# Patient Record
Sex: MALE | Race: WHITE | NOT HISPANIC OR LATINO | Employment: OTHER | URBAN - METROPOLITAN AREA
[De-identification: names, ages, dates, MRNs, and addresses within clinical notes are randomized per-mention and may not be internally consistent; named-entity substitution may affect disease eponyms.]

---

## 2017-11-02 ENCOUNTER — OFFICE VISIT (OUTPATIENT)
Dept: SURGERY | Facility: CLINIC | Age: 52
End: 2017-11-02

## 2017-11-02 VITALS — WEIGHT: 171 LBS | HEIGHT: 63 IN | OXYGEN SATURATION: 98 % | HEART RATE: 78 BPM | BODY MASS INDEX: 30.3 KG/M2

## 2017-11-02 DIAGNOSIS — K64.5 THROMBOSED EXTERNAL HEMORRHOID: Primary | ICD-10-CM

## 2017-11-02 PROCEDURE — 99213 OFFICE O/P EST LOW 20 MIN: CPT | Performed by: SURGERY

## 2017-11-02 RX ORDER — LISINOPRIL 10 MG/1
TABLET ORAL
Refills: 1 | COMMUNITY
Start: 2017-09-19 | End: 2018-12-13

## 2017-11-02 RX ORDER — ATORVASTATIN CALCIUM 40 MG/1
TABLET, FILM COATED ORAL
Refills: 3 | COMMUNITY
Start: 2017-09-19 | End: 2022-08-15 | Stop reason: ALTCHOICE

## 2017-11-03 NOTE — PROGRESS NOTES
SUMMARY (A/P):    52-year-old gentleman presents with a one-month history of moderately severe perianal pain that seems to be worse with sitting.  On examination he seems to have a small but resolving thrombosed right sided external hemorrhoid.  I've recommended sitz baths on a regular basis.  I've asked him to return in 3-4 weeks if his symptoms have not resolved.      CC:  Anal pain    HPI:  52-year-old gentleman presents with 1 month history of moderately severe perianal pain that is worse with sitting.  No rectal bleeding.  Hemoglobin on 10/26/2017 was 15.3.    PHYSICAL EXAM:   Constitutional: Well-developed well-nourished, no acute distress   Heart rate 78   Weight (pounds) 171   BMI 30.3   Height (inches) 63  Eyes: Conjunctiva normal, sclera nonicteric  ENMT: Hearing grossly normal, oral mucosa moist  Gastrointestinal: Soft, nontender, small seemingly resolving right-sided thrombosed external hemorrhoid, no significant tenderness on palpation other than over the hemorrhoid itself  Musculoskeletal: Symmetric strength, normal gait  Psychiatric: Alert and oriented ×3, normal affect     ALLERGIES: reviewed, in Epic    MEDICATIONS: reviewed, in Epic    PMH:    Hypertension  Hyperlipidemia    PSH:    -EGD and colonoscopy 11/16/2015 (both normal) Dr. Powell  -Laparoscopic left inguinal hernia repair 1/28/2013 Dr. Powell    FAMILY HISTORY:    Negative for colorectal cancer    SOCIAL HISTORY:   Denies tobacco use  Denies alcohol use    ROS:  No chest pain or shortness of air.  Negative for unexpected weight loss  All other systems reviewed and negative other than presenting complaints.    DANIELLE POWELL M.D.

## 2018-03-15 ENCOUNTER — TELEPHONE (OUTPATIENT)
Dept: CARDIOLOGY | Facility: CLINIC | Age: 53
End: 2018-03-15

## 2018-03-15 NOTE — TELEPHONE ENCOUNTER
03/15/18  Edmin Ruslan  1965    Mr. Mercer walked into office on Tuesday, 3/13 to schedule new patient appt with Dr. Lynch. He states he was referred by Dr. Patel. I called Dr. Patel's office that day to see what patient needed to be seen for; they stated records were being faxed. I nor Alessandra have received these records.    I called Dr. Patel's office today for records. I was not able to leave a message on machine x 2. I faxed a request for records for this patient.    Parul ZIEGLER RN    03/15/18  1:16 PM  Office note received - tmm.

## 2018-05-09 ENCOUNTER — OFFICE VISIT (OUTPATIENT)
Dept: CARDIOLOGY | Facility: CLINIC | Age: 53
End: 2018-05-09

## 2018-05-09 VITALS
BODY MASS INDEX: 28.66 KG/M2 | DIASTOLIC BLOOD PRESSURE: 80 MMHG | WEIGHT: 172 LBS | SYSTOLIC BLOOD PRESSURE: 132 MMHG | HEART RATE: 59 BPM | HEIGHT: 65 IN

## 2018-05-09 DIAGNOSIS — E78.5 HYPERLIPIDEMIA, UNSPECIFIED HYPERLIPIDEMIA TYPE: ICD-10-CM

## 2018-05-09 DIAGNOSIS — I10 ESSENTIAL HYPERTENSION: Primary | ICD-10-CM

## 2018-05-09 PROCEDURE — 93000 ELECTROCARDIOGRAM COMPLETE: CPT | Performed by: INTERNAL MEDICINE

## 2018-05-09 PROCEDURE — 99203 OFFICE O/P NEW LOW 30 MIN: CPT | Performed by: INTERNAL MEDICINE

## 2018-05-09 NOTE — PROGRESS NOTES
Subjective:     Encounter Date:05/09/2018      Patient ID: Kayla Mercer is a 53 y.o. male.    Chief Complaint: HTN, hyperlipidemia    History of Present Illness    Dear Dr. Patel:    I had the pleasure of seeing the patient in cardiac followup today. As you well know, he is a duyen 53-year-old man with history of hypertension and hyperlipidemia. I saw him over 5 years ago, at which point he had some atypical chest pain.    He is a former smoker. He quit 15 years ago. 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 It is a very physical job and he gets a lot of exercise with it.    He says that he has a lot of anxiety. He is under stress both at work and at home. When he gets too stressed out, he does notice a pain in his upper left chest that sometimes radiates to his arm. He says that this is not a constant discomfort. It is highly variable but does not seem to occur at all during exercise.    He takes medication for hypertension and hyperlipidemia, though he says he is not very consistent about it.        Review of Systems   All other systems reviewed and are negative.    Family History   Problem Relation Age of Onset   • Heart disease Father      Social History   Substance Use Topics   • Smoking status: Former Smoker     Types: Cigarettes     Quit date: 5/9/2003   • Smokeless tobacco: Never Used   • Alcohol use No         ECG 12 Lead  Date/Time: 5/9/2018 9:31 AM  Performed by: AVIVA STALEY  Authorized by: AVIVA STALEY   Previous ECG: no previous ECG available  Rhythm: sinus rhythm  BPM: 59  Clinical impression: normal ECG               Objective:     Physical Exam   Constitutional: He is oriented to person, place, and time. He appears well-developed and well-nourished.   HENT:   Head: Normocephalic and atraumatic.   Neck: Normal range of motion. Neck supple.   Cardiovascular: Normal rate, regular rhythm and normal heart sounds.    Pulmonary/Chest: Effort normal and breath sounds normal.   Abdominal: Soft. Bowel  sounds are normal.   Musculoskeletal: Normal range of motion.   Neurological: He is alert and oriented to person, place, and time.   Skin: Skin is warm and dry.   Psychiatric: He has a normal mood and affect. His behavior is normal. Thought content normal.   Vitals reviewed.      Lab Review:       Assessment:          Diagnosis Plan   1. Essential hypertension     2. Hyperlipidemia, unspecified hyperlipidemia type            Plan:       It was a pleasure to see your patient in cardiac followup today. He is a duyen 53-year-old man with history of coronary artery disease in his father. He has treated hypertension and hyperlipidemia. He is a former smoker.    He is in very good physical shape. He exercises regularly as part of his job and he has no symptoms. I think he has some atypical stress-induced pain. I have given him reassurance about this. I do not think he needs any further evaluation at this time. I have encouraged him to try to take his medicines on a more regular basis.    He will contact me if he develops any change in his symptoms. Otherwise, I will see him again in a few years.

## 2018-12-11 PROBLEM — E78.5 HYPERLIPIDEMIA: Status: ACTIVE | Noted: 2018-12-11

## 2018-12-11 PROBLEM — I10 ESSENTIAL HYPERTENSION: Status: ACTIVE | Noted: 2018-12-11

## 2018-12-11 NOTE — PROGRESS NOTES
Date of Office Visit: 2018  Encounter Provider: BARBARA Valdes  Place of Service: [facility] CARDIOLOGY  Patient Name: Kayla Mercer  :1965      Subjective:     Chief Complaint:  Hypertension, hyperlipidemia    History of Present Illness:  Kayla Mercer is a pleasant 53 y.o. male who is new to me.  Outside records have been obtained and reviewed by me.  He has past medical history is outlined below.    The patient was last seen in office on 18 by Dr. Lynch.  He has a history of hypertension and hyperlipidemia.  Dr. Lynch last saw him over 5 years ago at which point he had some atypical chest pain.  He is a former smoker and quit about 15 years ago.  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  The patient does report he has a lot of anxiety.  He is under a lot of stress both at home and at work.  It was reported to Dr. Lynch that when he gets too stressed out he does notice a pain in his upper left chest that sometimes radiates to his arm.  He says that it's not a constant discomfort in it is highly variable but does not seem to occur at all during exercise.  He does have medication for his hypertension and hyperlipidemia, although he is not very consistent about taking it.  He also has a reported history of coronary artery disease in his father.  Dr. Lynch felt that he was in very good physical shape and that he exercises regularly as part of his job and his symptoms were not exacerbated by that.  Dr. Lynch felt that he has some atypical stressed induced pain.  Dr. Lynch reassured him about that.  He did not think he needed any further evaluation at that time.  He wanted him to take his medications on a more regular basis.  Dr. Lynch stated he could contact him if he developed any change in his symptoms otherwise he would see him again in a few years.    The patient is presenting back for follow-up for reported pain in his left side  and back.  He reports the pain starts in his left trapezius muscle and travels down his back into his left breast and chest area.  This occurs when he is more stressed out.  It does occasionally radiate into his left forearm.  It is described as a pressure.  It was worse last week and is now better this week.  He reports sometimes stretching and rest helps this.  It is not particularly worse with certain activities.  He reports that when he is more stressed he also gets a headache.  He takes aspirin 81 mg daily but sometimes will take 2 or 3 more aspirin a day to see if it helps with the pain and he is unsure if it helps or not.  He has a wrist blood pressure cuff at home and his blood pressure the lowest it's ever run was 138/91.  He reports Dr. Patel has always told him his blood pressure was okay.  He reports he just had blood work done with Dr. Patel and was told his cholesterol levels were high.  He is not currently taking his atorvastatin.  On his labs with Dr. Patel his kidney function was normal, liver function was normal, lipid panel was abnormal. With his back pain he does not have any associated shortness of breath, palpitations he does get a little bit sweatier however.  He denies any nausea, vomiting or diarrhea with it.  He denies any swelling of the legs, PND or orthopnea.  He denies any syncope, near-syncope and reports rare lightheadedness.  He denies any significant fatigue.      Requesting old records it appears that the patient had a cardiac catheterization on 8/13/2002 for unstable angina with Dr. Hilliard.  It revealed a left main with luminal irregularities.  The LAD gave rise to 2 diagonal branches in the mid segment there was a focal stenosis of approximately 50%.  There is a 30-40% narrowing just prior to the first diagonal branch.  The second diagonal branch had up to 75% ostial stenosis.  The ramus direction of 2 vessels that took off from the ramus distribution one was small and  had a 50% ostial narrowing and the other one was larger and was free of disease.  The left circumflex was essentially of one marginal branch and there were luminal irregularities.  The RCA was extremely large and the caliber and length were much larger than the LAD.  It Gave rise to a PDA and large TASNEEM branch.  The RCA was essentially normal.  The LV gram revealed a normal left ventricular cavity size wall thickness and contractility.  It was felt that he most likely had insignificant coronary artery disease and needed aggressive risk factor reduction.  On 7/19/2002 he had a exercise stress echo which at rest had a normal LV and RV systolic function with an EF of 55% was negative for ischemia during stress.      Past Medical History:   Diagnosis Date   • Hemorrhoids    • Hyperlipidemia    • Hypertension      Past Surgical History:   Procedure Laterality Date   • ENDOSCOPY N/A 03/13/2013    EGD with biopsy of gastric antrum: mild gastritis-Dr. Manny Powell   • ENDOSCOPY AND COLONOSCOPY N/A 11/16/2015    EGD with biopsy of duodenum and gastric antrum (normal), colonoscopy to terminal ileum (normal)-Dr. Manny Powell   • LAPAROSCOPIC INGUINAL HERNIA REPAIR Left 01/28/2013    Dr. Manny Powell     Outpatient Medications Prior to Visit   Medication Sig Dispense Refill   • aspirin 81 MG chewable tablet aspirin 81 mg tablet,delayed release   Take 1 tablet every day by oral route.     • atorvastatin (LIPITOR) 40 MG tablet TAKE 1 TABLET BY MOUTH IN THE MORNING  3   • Lidocaine 0.5 % gel Apply 1 application topically As Needed.     • lisinopril (PRINIVIL,ZESTRIL) 10 MG tablet TAKE 1 TABLET BY MOUTH TWO TIMES A DAY  1     No facility-administered medications prior to visit.        Allergies as of 12/13/2018   • (No Known Allergies)     Social History     Socioeconomic History   • Marital status:      Spouse name: Not on file   • Number of children: Not on file   • Years of education: Not on file   • Highest  education level: Not on file   Social Needs   • Financial resource strain: Not on file   • Food insecurity - worry: Not on file   • Food insecurity - inability: Not on file   • Transportation needs - medical: Not on file   • Transportation needs - non-medical: Not on file   Occupational History   • Not on file   Tobacco Use   • Smoking status: Former Smoker     Types: Cigarettes     Last attempt to quit: 5/9/2003     Years since quitting: 15.6   • Smokeless tobacco: Never Used   Substance and Sexual Activity   • Alcohol use: No   • Drug use: No   • Sexual activity: Defer   Other Topics Concern   • Not on file   Social History Narrative   • Not on file     Family History   Problem Relation Age of Onset   • Heart disease Father      Review of Systems   Constitution: Negative for chills, fever and malaise/fatigue.   HENT: Negative for ear pain, hearing loss, nosebleeds and sore throat.    Eyes: Negative for double vision, pain, vision loss in left eye and vision loss in right eye.   Cardiovascular: Negative for chest pain, claudication, dyspnea on exertion, irregular heartbeat, leg swelling, near-syncope, orthopnea, palpitations, paroxysmal nocturnal dyspnea and syncope.   Respiratory: Negative for cough, shortness of breath, snoring and wheezing.    Endocrine: Negative for cold intolerance and heat intolerance.   Hematologic/Lymphatic: Negative for bleeding problem.   Skin: Negative for color change, itching, rash and unusual hair distribution.   Musculoskeletal: Positive for back pain. Negative for joint pain and joint swelling.   Gastrointestinal: Negative for abdominal pain, diarrhea, hematochezia, melena, nausea and vomiting.   Genitourinary: Negative for decreased libido, frequency, hematuria, hesitancy and incomplete emptying.   Neurological: Positive for light-headedness. Negative for excessive daytime sleepiness, dizziness, headaches, loss of balance, numbness, paresthesias and seizures.  "  Psychiatric/Behavioral: Negative for depression.          Objective:     Vitals:    12/13/18 0856   BP: 150/92   BP Location: Left arm   Patient Position: Sitting   Pulse: 57   Weight: 79.4 kg (175 lb)   Height: 165.1 cm (65\")     Body mass index is 29.12 kg/m².    PHYSICAL EXAM:  Physical Exam   Constitutional: He is oriented to person, place, and time. He appears well-developed and well-nourished. No distress.   Overweight   HENT:   Head: Normocephalic and atraumatic.   Eyes: Conjunctivae and EOM are normal.   Neck: Neck supple. No JVD present. Carotid bruit is not present.   Cardiovascular: Normal rate, regular rhythm, normal heart sounds and intact distal pulses.   No murmur heard.  Pulses:       Radial pulses are 2+ on the right side, and 2+ on the left side.        Posterior tibial pulses are 2+ on the right side, and 2+ on the left side.   Pulmonary/Chest: Effort normal and breath sounds normal. No accessory muscle usage. No tachypnea. No respiratory distress. He has no decreased breath sounds. He has no wheezes. He has no rhonchi. He has no rales. He exhibits no tenderness.   Abdominal: Soft. Bowel sounds are normal. He exhibits no distension. There is no tenderness. There is no rebound.   Musculoskeletal: Normal range of motion. He exhibits no edema.        Arms:  Neurological: He is alert and oriented to person, place, and time.   Skin: Skin is warm, dry and intact. He is not diaphoretic. No erythema.   Psychiatric: He has a normal mood and affect. His speech is normal and behavior is normal. Judgment and thought content normal. Cognition and memory are normal.   Nursing note and vitals reviewed.        ECG 12 Lead  Date/Time: 12/13/2018 9:04 AM  Performed by: Ursula Rodríguez APRN  Authorized by: Ursula Rodríguez APRN   Comparison: compared with previous ECG from 5/9/2018  Similar to previous ECG  Rhythm: sinus bradycardia  Rate: bradycardic  BPM: 57  QRS axis: normal              Assessment:       " Diagnosis Plan   1. Coronary arteriosclerosis  Treadmill Stress Test   2. Essential hypertension  Treadmill Stress Test   3. Hyperlipidemia, unspecified hyperlipidemia type  Treadmill Stress Test   4. Anxiety  Treadmill Stress Test       Plan:       1.  Coronary Artery Disease  Plan  • Lifestyle modifications discussed include adhering to a heart healthy diet, avoidance of tobacco products, maintenance of a healthy weight, medication compliance, regular exercise and regular monitoring of cholesterol and blood pressure    Subjective - Objective  • Current antiplatelet therapy includes aspirin 81 mg      He is not compliant with his antihypertensive medication or statin.  I believe there may be somewhat of a language barrier component to this as well.  It appears his last stress test was 2002 and was normal although he has known coronary disease.  Although his symptoms are nonspecific and do not sound like typical angina I will repeat a treadmill stress test as it has been over 15 years.  I advised him to continue on aspirin and that he needs to resume his atorvastatin and I will be starting amlodipine for his blood pressure.    2.Hypertension:  No longer taking lisinopril 10 mg daily by his PCP Dr. Patel.  I will start amlodipine 5 mg daily.  I advised the patient to get an arm blood pressure cuff and keep an eye on his blood pressure at home.  I wrote out detailed instructions and advised him to let me know if his systolic was staying above 140 or diastolic staying above 80.  He demonstrated understanding. He will come back for a b/p check in 2 weeks on his new antihypertensive medication.     3. Hyperlipidemia: 12/6/18 labs by PCP showed his LDL was elevated at 141, VLDL was 50, triglycerides 248 and total cholesterol 235.  LFTs were normal. Supposed to be taking atorvastatin 40 mg daily by his PCP Dr. Patel.  He has not been taking.  I advised him to discuss with Dr. Patel but he should be taking his  atorvastatin with his known history of coronary artery disease. He demonstrated understanding.    4. Anxiety: As far stress management goes he was asking for a pill for this.  I advised he needed to discuss further with Dr. Patel.  It looks like Dr. Patel prescribed Xanax and baclofen for his anxiety and muscle tension on 12/6/18.  I did talk to him about stress management techniques, stretching and deep breathing.    I advised the patient  (and wrote it down) that if they have not heard from our office within 2-3 days after they have completed their stress testing to please call our office to obtain their results.  He demonstrated understanding.        Blood pressure check in 2 weeks.  I will have patient Follow up with Dr. Lynch in 6 months, unless otherwise needed sooner based on stress test results.  I advised the patient to contact our office with any questions or concerns.         Your medication list           Accurate as of 12/13/18  9:52 AM. If you have any questions, ask your nurse or doctor.               START taking these medications      Instructions Last Dose Given Next Dose Due   amLODIPine 5 MG tablet  Commonly known as:  NORVASC  Started by:  BARBARA Valdes      Take 1 tablet by mouth Daily.          CONTINUE taking these medications      Instructions Last Dose Given Next Dose Due   aspirin 81 MG chewable tablet      aspirin 81 mg tablet,delayed release   Take 1 tablet every day by oral route.       atorvastatin 40 MG tablet  Commonly known as:  LIPITOR      TAKE 1 TABLET BY MOUTH IN THE MORNING       Lidocaine 0.5 % gel      Apply 1 application topically As Needed.          STOP taking these medications    lisinopril 10 MG tablet  Commonly known as:  PRINIVIL,ZESTRIL  Stopped by:  BARBARA Valdes              Where to Get Your Medications      These medications were sent to [id number] - [place], [place] - [address] [phone] [facility] [phone]   [address]  [place] [address]    Phone:  [phone]   · amLODIPine 5 MG tablet         The above medication changes may not have been made by this provider.  Medication list was updated to reflect medications patient is currently taking including medication changes and discontinuations made by other healthcare providers.       . It has been a pleasure to participate in this patient's care. Please feel free to contact me with any questions or concerns.     Ursula Rodríguez, APRN  12/13/2018       Dictated utilizing Dragon Dictation System.

## 2018-12-13 ENCOUNTER — OFFICE VISIT (OUTPATIENT)
Dept: CARDIOLOGY | Facility: CLINIC | Age: 53
End: 2018-12-13

## 2018-12-13 VITALS
HEART RATE: 57 BPM | BODY MASS INDEX: 29.16 KG/M2 | WEIGHT: 175 LBS | HEIGHT: 65 IN | DIASTOLIC BLOOD PRESSURE: 92 MMHG | SYSTOLIC BLOOD PRESSURE: 150 MMHG

## 2018-12-13 DIAGNOSIS — I10 ESSENTIAL HYPERTENSION: ICD-10-CM

## 2018-12-13 DIAGNOSIS — E78.5 HYPERLIPIDEMIA, UNSPECIFIED HYPERLIPIDEMIA TYPE: ICD-10-CM

## 2018-12-13 DIAGNOSIS — I25.10 CORONARY ARTERIOSCLEROSIS: Primary | ICD-10-CM

## 2018-12-13 DIAGNOSIS — F41.9 ANXIETY: ICD-10-CM

## 2018-12-13 PROBLEM — J30.2 SEASONAL ALLERGIES: Status: ACTIVE | Noted: 2018-12-06

## 2018-12-13 PROBLEM — M54.2 NECK PAIN: Status: ACTIVE | Noted: 2018-12-06

## 2018-12-13 PROBLEM — K58.9 IRRITABLE BOWEL SYNDROME: Status: ACTIVE | Noted: 2018-12-06

## 2018-12-13 PROBLEM — F32.A DEPRESSIVE DISORDER: Status: ACTIVE | Noted: 2018-12-06

## 2018-12-13 PROBLEM — N52.8 OTHER MALE ERECTILE DYSFUNCTION: Status: ACTIVE | Noted: 2018-12-06

## 2018-12-13 PROCEDURE — 99214 OFFICE O/P EST MOD 30 MIN: CPT | Performed by: NURSE PRACTITIONER

## 2018-12-13 PROCEDURE — 93000 ELECTROCARDIOGRAM COMPLETE: CPT | Performed by: NURSE PRACTITIONER

## 2018-12-13 RX ORDER — AMLODIPINE BESYLATE 5 MG/1
5 TABLET ORAL DAILY
Qty: 30 TABLET | Refills: 3 | Status: SHIPPED | OUTPATIENT
Start: 2018-12-13 | End: 2022-08-15 | Stop reason: ALTCHOICE

## 2018-12-13 RX ORDER — ASPIRIN 81 MG/1
TABLET, CHEWABLE ORAL
COMMUNITY

## 2018-12-24 ENCOUNTER — HOSPITAL ENCOUNTER (OUTPATIENT)
Dept: CARDIOLOGY | Facility: HOSPITAL | Age: 53
Discharge: HOME | End: 2018-12-24
Admitting: NURSE PRACTITIONER

## 2018-12-24 DIAGNOSIS — I25.10 CORONARY ARTERIOSCLEROSIS: ICD-10-CM

## 2018-12-24 DIAGNOSIS — F41.9 ANXIETY: ICD-10-CM

## 2018-12-24 DIAGNOSIS — E78.5 HYPERLIPIDEMIA, UNSPECIFIED HYPERLIPIDEMIA TYPE: ICD-10-CM

## 2018-12-24 DIAGNOSIS — I10 ESSENTIAL HYPERTENSION: ICD-10-CM

## 2018-12-24 LAB
BH CV STRESS BP STAGE 1: NORMAL
BH CV STRESS BP STAGE 2: NORMAL
BH CV STRESS BP STAGE 3: NORMAL
BH CV STRESS BP STAGE 4: NORMAL
BH CV STRESS DURATION MIN STAGE 1: 3
BH CV STRESS DURATION MIN STAGE 2: 3
BH CV STRESS DURATION MIN STAGE 3: 3
BH CV STRESS DURATION MIN STAGE 4: 2
BH CV STRESS DURATION SEC STAGE 1: 0
BH CV STRESS DURATION SEC STAGE 2: 0
BH CV STRESS DURATION SEC STAGE 3: 0
BH CV STRESS DURATION SEC STAGE 4: 30
BH CV STRESS GRADE STAGE 1: 10
BH CV STRESS GRADE STAGE 2: 12
BH CV STRESS GRADE STAGE 3: 14
BH CV STRESS GRADE STAGE 4: 16
BH CV STRESS HR STAGE 1: 93
BH CV STRESS HR STAGE 2: 105
BH CV STRESS HR STAGE 3: 120
BH CV STRESS HR STAGE 4: 144
BH CV STRESS METS STAGE 1: 5
BH CV STRESS METS STAGE 2: 7.5
BH CV STRESS METS STAGE 3: 10
BH CV STRESS METS STAGE 4: 13.5
BH CV STRESS PROTOCOL 1: NORMAL
BH CV STRESS RECOVERY BP: NORMAL MMHG
BH CV STRESS RECOVERY HR: 86 BPM
BH CV STRESS SPEED STAGE 1: 1.7
BH CV STRESS SPEED STAGE 2: 2.5
BH CV STRESS SPEED STAGE 3: 3.4
BH CV STRESS SPEED STAGE 4: 4.2
BH CV STRESS STAGE 1: 1
BH CV STRESS STAGE 2: 2
BH CV STRESS STAGE 3: 3
BH CV STRESS STAGE 4: 4
MAXIMAL PREDICTED HEART RATE: 167 BPM
PERCENT MAX PREDICTED HR: 86.23 %
STRESS BASELINE BP: NORMAL MMHG
STRESS BASELINE HR: 69 BPM
STRESS PERCENT HR: 101 %
STRESS POST ESTIMATED WORKLOAD: 13 METS
STRESS POST EXERCISE DUR MIN: 11 MIN
STRESS POST EXERCISE DUR SEC: 30 SEC
STRESS POST PEAK BP: NORMAL MMHG
STRESS POST PEAK HR: 144 BPM
STRESS TARGET HR: 142 BPM

## 2018-12-24 PROCEDURE — 93018 CV STRESS TEST I&R ONLY: CPT | Performed by: INTERNAL MEDICINE

## 2018-12-24 PROCEDURE — 93017 CV STRESS TEST TRACING ONLY: CPT

## 2018-12-24 PROCEDURE — 93016 CV STRESS TEST SUPVJ ONLY: CPT | Performed by: INTERNAL MEDICINE

## 2022-08-15 ENCOUNTER — OFFICE VISIT (OUTPATIENT)
Dept: SURGERY | Facility: CLINIC | Age: 57
End: 2022-08-15

## 2022-08-15 VITALS — WEIGHT: 178.4 LBS | BODY MASS INDEX: 29.72 KG/M2 | HEIGHT: 65 IN

## 2022-08-15 DIAGNOSIS — K60.2 ANAL FISSURE: Primary | ICD-10-CM

## 2022-08-15 PROCEDURE — 99203 OFFICE O/P NEW LOW 30 MIN: CPT | Performed by: SURGERY

## 2022-08-15 RX ORDER — ROSUVASTATIN CALCIUM 40 MG/1
TABLET, COATED ORAL
COMMUNITY
Start: 2022-06-02

## 2022-08-15 RX ORDER — LISINOPRIL 5 MG/1
TABLET ORAL
COMMUNITY
Start: 2022-07-02

## 2022-08-15 RX ORDER — METOPROLOL SUCCINATE 50 MG/1
TABLET, EXTENDED RELEASE ORAL
COMMUNITY
Start: 2022-07-02

## 2022-08-15 NOTE — PROGRESS NOTES
ASSESSMENT/PLAN:    57-year-old gentleman with probable posterior anal fissure.  I have prescribed diltiazem cream and instructed him to return after 1 month if his symptoms do not resolve.  He will be due surveillance colonoscopy in 3 years.    CC:     Anal pain    HPI:    57-year-old gentleman presents with mild perianal pain without associated rectal bleeding.    ENDOSCOPY:   • EGD 11/16/2015  • Colonoscopy  11/16/2015 normal    SOCIAL HISTORY:   • No tobacco use  • No alcohol use    FAMILY HISTORY:    • Colorectal cancer: negative    PREVIOUS ABDOMINAL SURGERY    • Laparoscopic left inguinal hernia repair 2013    PAST MEDICAL HISTORY:    • HTN   • Hyperlipidemia    MEDICATIONS:   • Aspirin 81 mg  • Lisinopril  • Metoprolol  • Crestor    ALLERGIES:   • None    PHYSICAL EXAM:   • Constitutional: Well-developed well-nourished, no acute distress  • Vital signs:   • Gastrointestinal: Soft, nontender  • Rectal: Small perineal skin tag of no consequence.  Posteriorly, he is somewhat tender to palpation appears to have a posterior anal fissure.  • Psychiatric: Alert and oriented ×3, normal affect     DANIELLE FENTON M.D.

## 2023-10-13 ENCOUNTER — TELEPHONE (OUTPATIENT)
Dept: SURGERY | Facility: CLINIC | Age: 58
End: 2023-10-13
Payer: COMMERCIAL

## 2023-10-13 NOTE — TELEPHONE ENCOUNTER
Received refill request for Diltiazem Lidocaine 2% cream from [facility]. Last seen on 8/22/22 for an anal fissure. Was given the Rx with 2 refills at that time. Okay to fill again or office visit first?

## 2024-02-29 ENCOUNTER — OFFICE VISIT (OUTPATIENT)
Dept: SURGERY | Facility: CLINIC | Age: 59
End: 2024-02-29
Payer: COMMERCIAL

## 2024-02-29 VITALS
WEIGHT: 181.2 LBS | DIASTOLIC BLOOD PRESSURE: 78 MMHG | HEIGHT: 65 IN | SYSTOLIC BLOOD PRESSURE: 152 MMHG | BODY MASS INDEX: 30.19 KG/M2

## 2024-02-29 DIAGNOSIS — L29.0 ANAL PRURITUS: Primary | ICD-10-CM

## 2024-02-29 PROBLEM — E66.9 OBESITY (BMI 30.0-34.9): Status: ACTIVE | Noted: 2024-02-29

## 2024-02-29 PROCEDURE — 99213 OFFICE O/P EST LOW 20 MIN: CPT | Performed by: SURGERY

## 2024-02-29 RX ORDER — TRAZODONE HYDROCHLORIDE 50 MG/1
1 TABLET ORAL
COMMUNITY

## 2024-02-29 RX ORDER — SULFAMETHOXAZOLE AND TRIMETHOPRIM 800; 160 MG/1; MG/1
TABLET ORAL
COMMUNITY
Start: 2023-12-27

## 2024-02-29 RX ORDER — TIZANIDINE 4 MG/1
4 TABLET ORAL
COMMUNITY
Start: 2024-01-19

## 2024-02-29 RX ORDER — BUSPIRONE HYDROCHLORIDE 10 MG/1
1 TABLET ORAL 2 TIMES DAILY
COMMUNITY

## 2024-02-29 RX ORDER — LISINOPRIL 20 MG/1
TABLET ORAL
COMMUNITY

## 2024-02-29 RX ORDER — MELOXICAM 15 MG/1
15 TABLET ORAL DAILY
COMMUNITY
Start: 2024-01-19

## 2024-02-29 RX ORDER — SERTRALINE HYDROCHLORIDE 25 MG/1
25 TABLET, FILM COATED ORAL DAILY
COMMUNITY
Start: 2023-11-22

## 2024-02-29 NOTE — PROGRESS NOTES
ASSESSMENT/PLAN:    58-year-old gentleman with anal pruritus.  He does confirm that he uses the wet sanitary wipes and I informed him that this generally worsens anal pruritus.  I recommended either soap and water or dry toilet paper for hygiene purposes.  I recommended that he get over-the-counter zinc oxide based cream to apply the anal area twice daily and this should resolve his symptoms.  He is due for surveillance colonoscopy next year.    CC:     Anal pruritus    HPI:    58-year-old gentleman presents with anal pruritus that has been going on for some time now.  I saw him in August 2022 when he had a posterior anal fissure and symptoms resolved with diltiazem cream.  He has been using the diltiazem cream again for this purpose without benefit.  He denies any recurrent anal pain.  Relevant review of systems negative other than presenting complaints.    ENDOSCOPY:   EGD 11/16/2015: Normal  Colonoscopy 11/16/2015: Normal    SOCIAL HISTORY:   Denies tobacco use  Denies alcohol use    FAMILY HISTORY:    Colorectal cancer: Negative    PREVIOUS ABDOMINAL SURGERY    Laparoscopic right inguinal hernia repair 1/28/2013    PAST MEDICAL HISTORY:    Hypertension  Hyperlipidemia  Anxiety/depression    MEDICATIONS:   Aspirin 81 mg  BuSpar  Lisinopril  Meloxicam  Metoprolol  Rosuvastatin  Sertraline  Tizanidine  Trazodone    ALLERGIES:   None    PHYSICAL EXAM:   Constitutional: No acute distress  Vital signs:   Weight: 181 pounds  Height: 65 inches  BMI: 30.2  Blood pressure 152/78  Respiratory: Normal nonlabored inspiratory effort  Cardiovascular: Regular rate, no jugular venous distention  Gastrointestinal: Soft.  Rectal exam reveals no visible or palpable external anal abnormalities.  No tenderness on palpation.    BMI is >= 30 and <35. (Class 1 Obesity). The following options were offered after discussion;: weight loss educational material (shared in after visit summary)       DANIELLE FENTON M.D.